# Patient Record
Sex: MALE | Race: WHITE | NOT HISPANIC OR LATINO
[De-identification: names, ages, dates, MRNs, and addresses within clinical notes are randomized per-mention and may not be internally consistent; named-entity substitution may affect disease eponyms.]

---

## 2022-01-12 PROBLEM — Z00.00 ENCOUNTER FOR PREVENTIVE HEALTH EXAMINATION: Status: ACTIVE | Noted: 2022-01-12

## 2022-02-01 ENCOUNTER — APPOINTMENT (OUTPATIENT)
Dept: NEPHROLOGY | Facility: CLINIC | Age: 52
End: 2022-02-01
Payer: COMMERCIAL

## 2022-02-01 VITALS
BODY MASS INDEX: 27.47 KG/M2 | WEIGHT: 175 LBS | HEART RATE: 83 BPM | DIASTOLIC BLOOD PRESSURE: 77 MMHG | HEIGHT: 67 IN | SYSTOLIC BLOOD PRESSURE: 109 MMHG

## 2022-02-01 DIAGNOSIS — R80.9 PROTEINURIA, UNSPECIFIED: ICD-10-CM

## 2022-02-01 DIAGNOSIS — Z86.39 PERSONAL HISTORY OF OTHER ENDOCRINE, NUTRITIONAL AND METABOLIC DISEASE: ICD-10-CM

## 2022-02-01 DIAGNOSIS — Z78.9 OTHER SPECIFIED HEALTH STATUS: ICD-10-CM

## 2022-02-01 DIAGNOSIS — N20.0 CALCULUS OF KIDNEY: ICD-10-CM

## 2022-02-01 PROCEDURE — 99203 OFFICE O/P NEW LOW 30 MIN: CPT

## 2022-02-01 RX ORDER — CHLORTHALIDONE 25 MG/1
25 TABLET ORAL DAILY
Qty: 90 | Refills: 3 | Status: ACTIVE | COMMUNITY
Start: 2022-02-01

## 2022-02-01 RX ORDER — FINASTERIDE 1 MG/1
1 TABLET ORAL DAILY
Refills: 0 | Status: ACTIVE | COMMUNITY
Start: 2022-02-01

## 2022-02-01 RX ORDER — LOSARTAN POTASSIUM 50 MG/1
50 TABLET, FILM COATED ORAL DAILY
Qty: 90 | Refills: 3 | Status: ACTIVE | COMMUNITY
Start: 2022-02-01

## 2022-02-01 RX ORDER — ATORVASTATIN CALCIUM 40 MG/1
40 TABLET, FILM COATED ORAL DAILY
Qty: 90 | Refills: 3 | Status: ACTIVE | COMMUNITY
Start: 2022-02-01

## 2022-02-01 NOTE — CONSULT LETTER
[Dear  ___] : Dear  [unfilled], [Consult Letter:] : I had the pleasure of evaluating your patient, [unfilled]. [Please see my note below.] : Please see my note below. [Consult Closing:] : Thank you very much for allowing me to participate in the care of this patient.  If you have any questions, please do not hesitate to contact me. [Sincerely,] : Sincerely, [FreeTextEntry3] : Solomon Degroot MD, KELLI\par Division of Nephrology\par [facility]\par  of Medicine\par [employer] \par \par \par \par \par

## 2022-02-01 NOTE — ASSESSMENT
[FreeTextEntry1] : hx of calcium oxalate nephrolithisasis and has b/l nonobstructing stones present on sono-- on chlorthalidone presumably to lower calcium excretion \par also hx proteinuria onARB--reports low grade but amount not known \par appers to be tolerating the meds well and they seem appropriate \par will check urine studies he has done - decide if needs to repeat\par reviewed lifestyle mod for stones escobar adequate fluid intake \par decide on need for f/u after  data reviewed

## 2022-02-01 NOTE — HISTORY OF PRESENT ILLNESS
[FreeTextEntry1] : asked to see by Dr De Los Santos for nephrolithiasis and proteinuria\par pt is a 50 yo w man hx of symptomatic  rt sided kidney stone abt 5 years ago-- had ESWL and passed calcium oxalate stone - noted to have b/p nonobstructive stones on sono 8/2021 and was put on chlorthalidone. \par has not had wanda recurrent kidney stone sx since original --though occasional rt sided discomfort reminiscent. \par Also reports long hx (abt 10 years)  low grade proteinuria (doesn’t know amount) for which pit on losartan x 4-5 years\par no hx HTN \par had several  twenty four urines for stone and proteinuria assessment -- doesn’t know results \par has chem from Dec 2021-- crea 0.8, ca 10, k 3.9,alb 5\par